# Patient Record
(demographics unavailable — no encounter records)

---

## 2021-10-30 NOTE — CAT SCAN REPORT
CT HEAD WITHOUT CONTRAST



INDICATION / CLINICAL INFORMATION: Dizzy, lightheaded, headache, Chiari malformation.



TECHNIQUE: All CT scans at this location are performed using CT dose reduction for ALARA by means of 
automated exposure control. 



COMPARISON: 10/07/20



FINDINGS:

HEMORRHAGE: None.

EXTRA-AXIAL SPACES: Normal in size and morphology for the patient's age.

VENTRICULAR SYSTEM: Normal in size and morphology for the patient's age.

CEREBRAL PARENCHYMA: No significant abnormality. No acute territorial infarct. 

MIDLINE SHIFT / HERNIATION: None.

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



ORBITS: Normal as visualized.

SOFT TISSUES: No significant abnormality.

SKULL: No significant abnormality.

PARANASAL SINUSES / MASTOID AIR CELLS: Normal as visualized.



ADDITIONAL FINDINGS: None.



IMPRESSION: No acute abnormality or significant change.



Signer Name: Zeke Goss MD 

Signed: 10/30/2021 7:03 PM

Workstation Name: [id number]

## 2021-10-30 NOTE — EMERGENCY DEPARTMENT REPORT
ED General Adult HPI





- General


Chief complaint: Dizziness


Stated complaint: HEAD PAIN, DIZZY, PAIN UNDER BREAST


Time Seen by Provider: 10/30/21 16:40


Source: patient


Mode of arrival: Ambulatory


Limitations: No Limitations





- History of Present Illness


Initial comments: 





Patient presents with a prolonged history of ongoing vague symptoms.  She states

that she has felt dizzy and lightheaded for a month.  This was gradual onset.  

It happens more so when she stands but not every time that she stands.  

Sometimes it happens when she sits.  She has a feeling of being off balance and 

falling.  She has not actually fallen and hurt herself.  She has been having 

occasional pain in the left chest that radiates both into the upper chest and 

into the breast.  There is no trauma.  She does not know if this is her 

costochondritis that is acting up or not.  There does not appear to be any 

soreness in her chest or breast according to her self exam.  Patient has had 

some degree of congestion and does report having seasonal allergies.  She has 

not had any significant cough or fever.  There has been no vomiting or diarrhea.

 She has no dysuria.  Because she has had the symptoms for a month, she decided 

to come here for evaluation today.


Severity scale (0 -10): 4





- Related Data


                                  Previous Rx's











 Medication  Instructions  Recorded  Last Taken  Type


 


Butalb/Acetamin/Caff -40 1 - 2 tab PO Q6HR PRN #15 tab 10/07/20 Unknown Rx





[Fioricet -40]    


 


Cyclobenzaprine [Flexeril] 10 mg PO Q8H PRN #21 tablet 10/07/20 Unknown Rx


 


Ibuprofen [Motrin] 800 mg PO Q8HR PRN #20 tablet 10/30/21 Unknown Rx


 


Meclizine [Antivert] 25 mg PO TID PRN #20 tablet 10/30/21 Unknown Rx











                                    Allergies











Allergy/AdvReac Type Severity Reaction Status Date / Time


 


ciprofloxacin [From Cipro] Allergy  Itching Verified 10/30/21 15:14














ED Review of Systems


ROS: 


Stated complaint: HEAD PAIN, DIZZY, PAIN UNDER BREAST


Other details as noted in HPI





Comment: All other systems reviewed and negative


Constitutional: denies: fever


Eyes: denies: vision change


ENT: denies: throat pain


Respiratory: see HPI


Cardiovascular: as per HPI


Endocrine: denies: unexplained weight loss


Gastrointestinal: denies: abdominal pain


Genitourinary: denies: dysuria


Musculoskeletal: denies: back pain


Skin: denies: rash


Neurological: as per HPI


Hematological/Lymphatic: denies: easy bruising





ED Past Medical Hx





- Past Medical History


Hx GERD: Yes


Additional medical history: ANXIETY/ SKULL MALFORMATION / COSTOCHONDIRA





- Surgical History


Past Surgical History?: No





- Family History


Family history: no significant





- Social History


Smoking Status: Never Smoker





- Medications


Home Medications: 


                                Home Medications











 Medication  Instructions  Recorded  Confirmed  Last Taken  Type


 


Butalb/Acetamin/Caff -40 1 - 2 tab PO Q6HR PRN #15 tab 10/07/20  Unknown 

Rx





[Fioricet -40]     


 


Cyclobenzaprine [Flexeril] 10 mg PO Q8H PRN #21 tablet 10/07/20  Unknown Rx


 


Ibuprofen [Motrin] 800 mg PO Q8HR PRN #20 tablet 10/30/21  Unknown Rx


 


Meclizine [Antivert] 25 mg PO TID PRN #20 tablet 10/30/21  Unknown Rx














ED Physical Exam





- General


Limitations: No Limitations, Other (Pulse ox noted and normal)


General appearance: alert, in no apparent distress, obese





- Head


Head exam: Present: atraumatic, normocephalic, normal inspection





- Eye


Eye exam: Present: normal appearance, EOMI.  Absent: scleral icterus





- ENT


ENT exam: Present: normal exam, normal orophraynx, mucous membranes moist, 

normal external ear exam





- Neck


Neck exam: Present: normal inspection.  Absent: meningismus





- Respiratory


Respiratory exam: Present: normal lung sounds bilaterally.  Absent: respiratory 

distress





- Cardiovascular


Cardiovascular Exam: Present: regular rate, normal rhythm





- GI/Abdominal


GI/Abdominal exam: Present: soft.  Absent: tenderness





- Extremities Exam


Extremities exam: Present: normal capillary refill





- Back Exam


Back exam: Absent: CVA tenderness (R), CVA tenderness (L)





- Neurological Exam


Neurological exam: Present: alert, oriented X3, CN II-XII intact, normal gait, 

reflexes normal, other (There is no pronator drift or dysdiadochokinesia.  NIH 

score is 0.).  Absent: motor sensory deficit





- Psychiatric


Psychiatric exam: Present: normal affect, normal mood





- Skin


Skin exam: Present: warm, dry





ED Course


                                   Vital Signs











  10/30/21





  15:24


 


Temperature 98.6 F


 


Pulse Rate 86


 


Respiratory 18





Rate 


 


Blood Pressure 111/56





[Right] 


 


O2 Sat by Pulse 99





Oximetry 














- Reevaluation(s)


Reevaluation #1: 





10/30/21 16:59


IV and labs ordered.  CT was ordered.  Old records reviewed.


Reevaluation #2: 





10/30/21 23:36


Work-up was complete and the patient was discharged





ED Medical Decision Making





- Lab Data


Result diagrams: 


                                 10/30/21 17:32





                                 10/30/21 17:32





- Medical Decision Making





Patient presented with lightheadedness and dizziness as well as chest pain and a

 headache.  She did not have focal neurologic findings or deficit suggestive of 

acute stroke.  This did not appear to be a central vertigo type process.  She 

did not have any vertiginous symptoms whatsoever.  Patient did not have any 

fever there was suggest infection.  Breast exam was completed by the [facility].  There

 was no reported pathology noted on that.  Patient does not have neurologic 

findings suggestive of stroke.  She was treated symptomatically and referred for

 outpatient evaluation.


Critical Care Time: No


Critical care attestation.: 


If time is entered above; I have spent that time in minutes in the direct care 

of this critically ill patient, excluding procedure time.








ED Disposition


Clinical Impression: 


 Lightheaded, Left-sided chest pain





Headache


Qualifiers:


 Headache type: unspecified Headache chronicity pattern: acute headache 

Intractability: not intractable Qualified Code(s): R51.9 - Headache, unspecified





Disposition: 01 HOME / SELF CARE / HOMELESS


Is pt being admited?: No


Condition: Stable


Instructions:  Nonspecific Chest Pain, Adult, Pain Without a Known Cause, 

Dizziness, Chest Wall Pain


Additional Instructions: 


DRINK WATER.  SEE YOUR DOCTOR FOR RECHECK.  RETURN FOR PROBLEMS.  REST THIS 

WEEKEND.


Prescriptions: 


Meclizine [Antivert] 25 mg PO TID PRN #20 tablet


 PRN Reason: Vertigo


Ibuprofen [Motrin] 800 mg PO Q8HR PRN #20 tablet


 PRN Reason: Pain , Severe (7-10)


Referrals: 


ROLAND RODRIGUEZ MD [Primary Care Provider] - 3-5 Days


LA BETANCOURT MD [Staff Physician] - 3-5 Days